# Patient Record
Sex: FEMALE | Race: WHITE | URBAN - METROPOLITAN AREA
[De-identification: names, ages, dates, MRNs, and addresses within clinical notes are randomized per-mention and may not be internally consistent; named-entity substitution may affect disease eponyms.]

---

## 2018-01-10 ENCOUNTER — APPOINTMENT (RX ONLY)
Dept: URBAN - METROPOLITAN AREA CLINIC 5 | Facility: CLINIC | Age: 68
Setting detail: DERMATOLOGY
End: 2018-01-10

## 2018-01-10 DIAGNOSIS — L85.3 XEROSIS CUTIS: ICD-10-CM

## 2018-01-10 DIAGNOSIS — B35.3 TINEA PEDIS: ICD-10-CM

## 2018-01-10 DIAGNOSIS — L20.89 OTHER ATOPIC DERMATITIS: ICD-10-CM

## 2018-01-10 PROBLEM — L20.84 INTRINSIC (ALLERGIC) ECZEMA: Status: ACTIVE | Noted: 2018-01-10

## 2018-01-10 PROBLEM — J44.9 CHRONIC OBSTRUCTIVE PULMONARY DISEASE, UNSPECIFIED: Status: ACTIVE | Noted: 2018-01-10

## 2018-01-10 PROBLEM — E05.90 THYROTOXICOSIS, UNSPECIFIED WITHOUT THYROTOXIC CRISIS OR STORM: Status: ACTIVE | Noted: 2018-01-10

## 2018-01-10 PROBLEM — F32.9 MAJOR DEPRESSIVE DISORDER, SINGLE EPISODE, UNSPECIFIED: Status: ACTIVE | Noted: 2018-01-10

## 2018-01-10 PROBLEM — F41.9 ANXIETY DISORDER, UNSPECIFIED: Status: ACTIVE | Noted: 2018-01-10

## 2018-01-10 PROBLEM — E78.5 HYPERLIPIDEMIA, UNSPECIFIED: Status: ACTIVE | Noted: 2018-01-10

## 2018-01-10 PROBLEM — K21.9 GASTRO-ESOPHAGEAL REFLUX DISEASE WITHOUT ESOPHAGITIS: Status: ACTIVE | Noted: 2018-01-10

## 2018-01-10 PROBLEM — I10 ESSENTIAL (PRIMARY) HYPERTENSION: Status: ACTIVE | Noted: 2018-01-10

## 2018-01-10 PROCEDURE — ? PRESCRIPTION

## 2018-01-10 PROCEDURE — 99203 OFFICE O/P NEW LOW 30 MIN: CPT

## 2018-01-10 PROCEDURE — ? COUNSELING

## 2018-01-10 RX ORDER — FLUTICASONE PROPIONATE 0.5 MG/G
CREAM TOPICAL BID
Qty: 1 | Refills: 1 | Status: ERX | COMMUNITY
Start: 2018-01-10

## 2018-01-10 RX ADMIN — FLUTICASONE PROPIONATE: 0.5 CREAM TOPICAL at 00:00

## 2018-01-10 ASSESSMENT — LOCATION SIMPLE DESCRIPTION DERM
LOCATION SIMPLE: RIGHT EYEBROW
LOCATION SIMPLE: RIGHT INDEX FINGER
LOCATION SIMPLE: RIGHT FOREARM
LOCATION SIMPLE: LEFT FOREARM
LOCATION SIMPLE: LEFT MIDDLE FINGER
LOCATION SIMPLE: GLABELLA
LOCATION SIMPLE: LEFT FOOT
LOCATION SIMPLE: LEFT EYELID

## 2018-01-10 ASSESSMENT — LOCATION ZONE DERM
LOCATION ZONE: EYELID
LOCATION ZONE: FINGER
LOCATION ZONE: ARM
LOCATION ZONE: FEET
LOCATION ZONE: FACE

## 2018-01-10 ASSESSMENT — LOCATION DETAILED DESCRIPTION DERM
LOCATION DETAILED: GLABELLA
LOCATION DETAILED: LEFT DISTAL PALMAR MIDDLE FINGER
LOCATION DETAILED: RIGHT MEDIAL EYEBROW
LOCATION DETAILED: LEFT DISTAL DORSAL FOREARM
LOCATION DETAILED: RIGHT INDEX FINGERTIP
LOCATION DETAILED: LEFT LATERAL PLANTAR FOOT
LOCATION DETAILED: LEFT LATERAL CANTHUS
LOCATION DETAILED: RIGHT DISTAL DORSAL FOREARM

## 2018-01-10 NOTE — HPI: OTHER
Condition:: Rash on left foot.
Please Describe Your Condition:: Two year history of scaling on left foot.  Is mildly pruritic and has formed vesicels.  Treated with Tinactin with slight benefit in the pruritus.  Moderate in severity.

## 2018-02-07 ENCOUNTER — APPOINTMENT (RX ONLY)
Dept: URBAN - METROPOLITAN AREA CLINIC 5 | Facility: CLINIC | Age: 68
Setting detail: DERMATOLOGY
End: 2018-02-07

## 2018-02-07 DIAGNOSIS — B35.3 TINEA PEDIS: ICD-10-CM

## 2018-02-07 DIAGNOSIS — L20.89 OTHER ATOPIC DERMATITIS: ICD-10-CM

## 2018-02-07 PROBLEM — L20.84 INTRINSIC (ALLERGIC) ECZEMA: Status: ACTIVE | Noted: 2018-02-07

## 2018-02-07 PROCEDURE — ? COUNSELING

## 2018-02-07 PROCEDURE — ? TREATMENT REGIMEN

## 2018-02-07 PROCEDURE — 99213 OFFICE O/P EST LOW 20 MIN: CPT

## 2018-02-07 ASSESSMENT — LOCATION ZONE DERM
LOCATION ZONE: FEET
LOCATION ZONE: FACE

## 2018-02-07 ASSESSMENT — LOCATION SIMPLE DESCRIPTION DERM
LOCATION SIMPLE: GLABELLA
LOCATION SIMPLE: RIGHT PLANTAR SURFACE
LOCATION SIMPLE: LEFT PLANTAR SURFACE

## 2018-02-07 ASSESSMENT — LOCATION DETAILED DESCRIPTION DERM
LOCATION DETAILED: RIGHT MEDIAL PLANTAR MIDFOOT
LOCATION DETAILED: LEFT MEDIAL PLANTAR MIDFOOT
LOCATION DETAILED: GLABELLA

## 2018-02-07 NOTE — PROCEDURE: TREATMENT REGIMEN
Detail Level: Zone
Continue Regimen: Lamisil prn\\nAmLactin cream bid prn dryness
Continue Regimen: fluticasone cream bid prn

## 2018-02-07 NOTE — HPI: OTHER
Condition:: tinea pedis
Please Describe Your Condition:: is following up for tinea pedis which has improved with Lamisil cream bid.  No pruritus now.

## 2018-05-15 ENCOUNTER — HOSPITAL ENCOUNTER (OUTPATIENT)
Dept: HOSPITAL 61 - KCIC MRI | Age: 68
Discharge: HOME | End: 2018-05-15
Payer: MEDICARE

## 2018-05-15 DIAGNOSIS — M16.11: Primary | ICD-10-CM

## 2018-05-15 PROCEDURE — 73718 MRI LOWER EXTREMITY W/O DYE: CPT

## 2018-11-14 ENCOUNTER — APPOINTMENT (RX ONLY)
Dept: URBAN - METROPOLITAN AREA CLINIC 5 | Facility: CLINIC | Age: 68
Setting detail: DERMATOLOGY
End: 2018-11-14

## 2018-11-14 DIAGNOSIS — L30.8 OTHER SPECIFIED DERMATITIS: ICD-10-CM

## 2018-11-14 PROCEDURE — 99212 OFFICE O/P EST SF 10 MIN: CPT

## 2018-11-14 PROCEDURE — ? COUNSELING

## 2018-11-14 PROCEDURE — ? TREATMENT REGIMEN

## 2018-11-14 PROCEDURE — ? PRESCRIPTION

## 2018-11-14 RX ORDER — CLOBETASOL PROPIONATE 0.5 MG/G
OINTMENT TOPICAL
Qty: 1 | Refills: 2 | Status: ERX | COMMUNITY
Start: 2018-11-14

## 2018-11-14 RX ADMIN — CLOBETASOL PROPIONATE: 0.5 OINTMENT TOPICAL at 17:29

## 2018-11-14 ASSESSMENT — LOCATION DETAILED DESCRIPTION DERM
LOCATION DETAILED: LEFT RING FINGERTIP
LOCATION DETAILED: RIGHT INDEX FINGERTIP

## 2018-11-14 ASSESSMENT — LOCATION ZONE DERM: LOCATION ZONE: FINGER

## 2018-11-14 ASSESSMENT — LOCATION SIMPLE DESCRIPTION DERM
LOCATION SIMPLE: LEFT RING FINGER
LOCATION SIMPLE: RIGHT INDEX FINGER

## 2018-11-14 ASSESSMENT — PAIN INTENSITY VAS: HOW INTENSE IS YOUR PAIN 0 BEING NO PAIN, 10 BEING THE MOST SEVERE PAIN POSSIBLE?: NO PAIN

## 2018-12-13 ENCOUNTER — APPOINTMENT (RX ONLY)
Dept: URBAN - METROPOLITAN AREA CLINIC 5 | Facility: CLINIC | Age: 68
Setting detail: DERMATOLOGY
End: 2018-12-13

## 2018-12-13 DIAGNOSIS — L30.8 OTHER SPECIFIED DERMATITIS: ICD-10-CM | Status: RESOLVING

## 2018-12-13 PROCEDURE — ? TREATMENT REGIMEN

## 2018-12-13 PROCEDURE — 99212 OFFICE O/P EST SF 10 MIN: CPT

## 2018-12-13 PROCEDURE — ? COUNSELING

## 2018-12-13 ASSESSMENT — LOCATION ZONE DERM
LOCATION ZONE: HAND
LOCATION ZONE: FINGER

## 2018-12-13 ASSESSMENT — LOCATION SIMPLE DESCRIPTION DERM
LOCATION SIMPLE: RIGHT HAND
LOCATION SIMPLE: LEFT MIDDLE FINGER

## 2018-12-13 ASSESSMENT — LOCATION DETAILED DESCRIPTION DERM
LOCATION DETAILED: RIGHT ULNAR DORSAL HAND
LOCATION DETAILED: LEFT DISTAL PALMAR MIDDLE FINGER

## 2018-12-13 ASSESSMENT — PAIN INTENSITY VAS: HOW INTENSE IS YOUR PAIN 0 BEING NO PAIN, 10 BEING THE MOST SEVERE PAIN POSSIBLE?: NO PAIN

## 2018-12-13 NOTE — PROCEDURE: TREATMENT REGIMEN
Detail Level: Detailed
Plan: If the clobetasol quits helping then we will send in protopic or eucrisa depending on her insurance.
Continue Regimen: clobetasol 0.05% ointment x 2 weeks then switch to vaseline

## 2019-06-26 ENCOUNTER — HOSPITAL ENCOUNTER (OUTPATIENT)
Dept: HOSPITAL 61 - KCIC MRI | Age: 69
Discharge: HOME | End: 2019-06-26
Payer: MEDICARE

## 2019-06-26 DIAGNOSIS — M25.48: ICD-10-CM

## 2019-06-26 DIAGNOSIS — M48.07: ICD-10-CM

## 2019-06-26 DIAGNOSIS — M51.36: Primary | ICD-10-CM

## 2019-06-26 DIAGNOSIS — M51.27: ICD-10-CM

## 2019-06-26 PROCEDURE — 72148 MRI LUMBAR SPINE W/O DYE: CPT

## 2019-06-26 NOTE — [facility]
MRI of the lumbar spine without contrast 6/26/2019

 

CLINICAL HISTORY: Low back pain which radiates down both legs for 6 

months.

 

TECHNIQUE: Unenhanced T1-weighted and T2-weighted sagittal and axial and 

inversion recovery sagittal images of the lumbar spine were obtained.

 

FINDINGS: Mild S-shaped curvature of the thoracolumbar spine is seen. 

Degenerative signal changes are seen involving all of the disks of the 

lumbar spine. Degenerative signal changes are seen within the marrow 

surrounding these discs. The conus medullaris is normal morphology, 

position, and signal characteristics. Rounded high signal intensity 

lesions are seen on the T2-weighted images which measure 8 mm to 3 cm in 

size. They likely represent cysts.

 

At the L1-2, L2-3 and L3-4 disc spaces there are minimal to mild 

generalized disc bulges. Degenerative changes are seen involving the facet

joints bilaterally. There is mild ligamentum flavum hypertrophy 

bilaterally. These findings when combined do not result in significant 

central spinal canal or neural foraminal stenosis.

 

At the L4-5 disc space there is a mild to moderate generalized disc bulge.

Degenerative changes are seen involving the facet joints bilaterally. 

There is mild ligamentum flavum hypertrophy bilaterally. There are small 

facet joint effusions. These findings when combined result in mild to 

moderate central spinal canal stenosis. No neural foraminal stenosis is 

seen.

 

At the L5-S1 disc space there is a mild generalized disc bulge. 

Superimposed this disc bulge is a focal central disc protrusion. This 

measures 3 mm in AP diameter. Degenerative changes are seen involving the 

facet joints bilaterally. There is mild to moderate ligamentum flavum 

hypertrophy bilaterally. These findings when combined result in mild 

central spinal canal stenosis. No neural foraminal stenosis is seen.

 

IMPRESSION: The changes of degenerative disc disease are seen throughout 

the lumbar spine. These findings result in mild to moderate central spinal

canal stenosis at L4-5 and mild central spinal canal stenosis at L5-S1. No

neural foraminal stenosis is seen.

 

Electronically signed by: Roc Moreland MD (6/26/2019 4:42 PM) [facility] [place]

## 2019-12-30 ENCOUNTER — HOSPITAL ENCOUNTER (OUTPATIENT)
Dept: HOSPITAL 61 - PNCL | Age: 69
Discharge: HOME | End: 2019-12-30
Attending: ANESTHESIOLOGY
Payer: MEDICARE

## 2019-12-30 DIAGNOSIS — J44.9: ICD-10-CM

## 2019-12-30 DIAGNOSIS — I10: ICD-10-CM

## 2019-12-30 DIAGNOSIS — M54.5: Primary | ICD-10-CM

## 2019-12-30 DIAGNOSIS — M19.90: ICD-10-CM

## 2019-12-30 PROCEDURE — G0463 HOSPITAL OUTPT CLINIC VISIT: HCPCS

## 2019-12-30 NOTE — PAIN
DATE OF SERVICE:  12/30/2019



INITIAL CONSULTATION FOR PAIN CLINIC



CHIEF COMPLAINT:  Low back and left lower extremity pain.



HISTORY OF PRESENT ILLNESS:  This is a 69-year-old female who presents with

history of pain in the low back and left lower extremity, posterior gluteus,

posterior thigh, lateral thigh and into the posterior knee on the left side. 

The patient reports that it has been on and off for about 2-3 years, not as a

result of any specific injury or action that she is aware of, but it is

gradually increasing with time and walking, worse with activity, standing and

changing positions, walking.  The patient reports it awakens her from sleep

about once a night, does not affect her bowel or bladder control, but does

affect her ability to walk.  She is not using any assistive devices, however,

such as canes or walkers.  The patient has not had any formal therapies

currently.  She has been doing some stretching on her own at home, but no formal

physical therapy, no counseling, chiropractic treatment, exercise or other

treatments.  The patient did see a neurosurgeon who is recommending conservative

treatment at this time.  The patient describes the pain as throbbing, tingling,

radiating, intermittent in intensity, but always present in low back, left

posterior gluteus, posterior thigh.  The patient has taken Neurontin, which

reported did decrease the pain mildly, but causes her to be very drowsy and

groggy.  The patient rates her disability rating from 0-10, 10 being the worst,

is an 8 with family home responsibilities and occupation, 6 with recreation and

social activity, 3 with self-care and 7 with life support activities, especially

sleeping.  The patient did have MRI scan of the lumbar spine showing

degenerative disk changes at L4-L5 and L5-S1 with a focal central disk

protrusion, superimposed on disk bulge at L5-S1 with moderate ligamentum flavum

hypertrophy bilaterally resulting in mild central spinal canal stenosis.  L4-L5

shows mild-to-moderate central spinal canal stenosis with moderate degenerative

disk bulge.



PAST MEDICAL HISTORY:  Significant for hearing loss, shortness of breath, COPD,

quit smoking 10 years ago, hypertension, arthritis.



PREVIOUS SURGERIES:  Include cataract extractions, cholecystectomy, breast

biopsy and tonsillectomy.



CURRENT MEDICATIONS:  Include simvastatin, Linzess, gabapentin, Celebrex,

Mysoline, tramadol, Advair inhaler, Wellbutrin, and levothyroxine.



ALLERGIES:  THE PATIENT IS ALLERGIC TO CODEINE.



FAMILY HISTORY:  Significant for cancers.



SOCIAL HISTORY:  The patient drinks alcohol about 4 cans a month on average. 

Quit smoking 10 years ago.  Does not use any illegal, illicit or recreational

drugs.  She is , lives with her spouse locally in [place], [place]. 

Reports she is currently retired.



REVIEW OF SYSTEMS:  The patient's review of systems is positive for those items

mentioned in history of present illness.  All systems reviewed and otherwise

negative.  It is complete, full and well documented on the patient's chart.



PHYSICAL EXAMINATION:

VITAL SIGNS:  The patient's blood pressure is 140/81, pulse is 80, respirations

18, temperature 97.2 degrees Fahrenheit, height is 5 feet 2 inches, weight 175

pounds.

GENERAL:  The patient is awake, alert, oriented, appropriate, very pleasant

demeanor.

HEENT:  Exam shows normocephalic, atraumatic.  Extraocular movements are intact

and symmetrical.  Oral cavity:  Mucous membranes moist and pink.  Dentition is

intact.

NECK:  Shows anterior throat supple without palpable lymphadenopathy noted. 

Swallow reflex symmetrical.

CHEST:  Shows normal on inspection.  Breath sounds are clear bilaterally.

HEART:  Shows S1, S2 clear.  No murmurs auscultated.

ABDOMEN:  Soft, nontender, nondistended.  No palpable organomegaly is noted.  No

rebound or guarding demonstrated.

BACK:  Shows spine grossly in the midline.  Normal-appearing thoracic kyphosis

and lumbar lordotic curvature slightly flattened.  Lumbar paraspinous muscle

shows symmetrical on inspection, with palpation shows some moderate tenderness

diffusely in the middle and lower distribution of paraspinous muscles only, but

without radiation.  No tenderness over the spinous processes, sacrum or

sacroiliac regions.  The patient has good rotational motion of lumbar spine,

both laterally greater than 10 degrees right and left as well as extension

greater than 10 degrees, forward flexion 45 degrees without significant pain

reported.

EXTREMITIES:  The patient's lower extremities show deep tendon reflexes 1+ in

the patellar and tendo-calcaneus tendons.  Motor exam is approximately 4 on a

scale of 5 with left dorsiflexion, extension, 5/5 on the right, quadriceps and

hamstring flexion likewise 4/5 left, 5/5 on the right.  Peripheral pulses are 1+

in posterior tibia.  No peripheral edema is noted.  Lower extremities are warm

and dry to touch, equal in color and appearance.  Straight leg raise noted to be

negative for reproduction of radicular symptoms bilaterally.  Sebastian's maneuver

is positive on the right, but negative on the left.  Gaenslen's maneuvers are

negative bilaterally.  The patient is able to stand.  It is difficult to turn

and stand on her toes, loses balance easily, but is walking with a slight

favoring gait favoring the left lower extremity, but only mildly, not using any

assistive devices to ambulate.



IMPRESSION:

1.  This is a 69-year-old female with a 2-3 year history of increasing pain in

the low back, left lower extremity in a radicular fashion.

2.  MRI scan of lumbar spine as noted.

3.  Chronic obstructive pulmonary disease.

4.  Hypertension.

5.  Arthritis.



PLAN:  Options were discussed with the patient including conservative medical

managements, physical therapies and interventional techniques.  She would like

to pursue interventional techniques.  We discussed a lumbar epidural steroid

injection using description as well as anatomical models to describe the

procedure.  The patient has family in town; however, they are leaving later

today.  She would like to get home and see them before they go without having a

procedure today.  We will reschedule this for approximately 1 week from now and

discuss the procedure more on her return.  The patient understands and we will

reschedule with plan on lumbar epidural steroid injection in approximately 1

week.

 



______________________________

DAISY ALMANZA MD



DR:  LAURA/douglas  JOB#:  [id number] / [id number]

DD:  12/30/2019 12:28  DT:  12/30/2019 13:13



LILI Hui MD

## 2020-01-13 ENCOUNTER — HOSPITAL ENCOUNTER (OUTPATIENT)
Dept: HOSPITAL 61 - PNCL | Age: 70
End: 2020-01-13
Attending: ANESTHESIOLOGY
Payer: MEDICARE

## 2020-01-13 DIAGNOSIS — M51.16: Primary | ICD-10-CM

## 2020-01-13 DIAGNOSIS — M48.061: ICD-10-CM

## 2020-01-13 PROCEDURE — 62323 NJX INTERLAMINAR LMBR/SAC: CPT

## 2020-01-13 NOTE — PAIN
DATE OF SERVICE:  01/13/2020



PROGRESS NOTE FOR PAIN CLINIC



DIAGNOSES:  Lumbar radiculopathy with lumbar degenerative disk disease and

lumbar spinal stenosis.



HISTORY OF PRESENT ILLNESS:  The patient is a 69-year-old female who returns for

followup status post initial evaluation and rescheduled for a lumbar epidural

steroid injection today.  The patient reports still significant pain in low

back, left lower extremity, posterior gluteus, posterior thigh, posterior calf,

as it was worse with walking, standing, changing positions, better with sitting

or resting.  Does not awaken her from sleep at night.  The patient reports no

new motor or sensory deficits, no new changes.  Rates her pain as an 8 on a

scale of 10 at its worst over the past week, 7 on average, 4 at its least and is

a 7 today.  The patient reports it is tight, shooting, burning, on and off in

intensity, worse with activity.  The patient reports no new motor or sensory

deficits, no new bowel or bladder incontinence or other complaints.



PHYSICAL EXAMINATION:

VITAL SIGNS:  The patient's blood pressure is 181/111, pulse 85, respirations

18, temperature 97.5 degrees Fahrenheit, height 5 feet 2 inches, weight is 176

pounds.

GENERAL:  The patient is awake, alert, oriented, appropriate, very pleasant

demeanor.

HEENT:  Shows normocephalic, atraumatic.  Extraocular movements are intact and

symmetrical.  Oral cavity shows mucous membranes moist and pink.  Dentition is

intact.

NECK:  Shows anterior throat supple without palpable lymphadenopathy noted. 

Swallow reflex symmetrical.

CHEST:  Shows normal on inspection.  Breath sounds clear to auscultation

bilaterally.

HEART:  Shows S1, S2 clear.  No murmurs auscultated.

ABDOMEN:  Soft, nontender, nondistended.  No palpable organomegaly is noted.  No

rebound or guarding demonstrated.

BACK:  Shows spine grossly in the midline.  Normal thoracic kyphosis, some minor

flattening of lumbar lordotic curvature.  Lumbar paraspinous muscle shows

symmetrical on inspection, on palpation shows some moderate tenderness diffusely

bilaterally going diffusely without significant radiation.

EXTREMITIES:  The patient's lower extremities show deep tendon reflexes at 1+ in

the patellar and tendo calcaneus tendons are equal.  Motor exam is approximately

4 on a scale 5 on the left and 5/5 on the right with dorsiflexion and extension.

 The patient's peripheral pulses are 1+ posterior tibia.  No peripheral edema is

noted bilaterally.



Options were discussed with the patient.  The patient's old chart was reviewed

as her current medication regimen updated.  Current review of systems updated

today as well.  We will proceed with a first in the series of lumbar epidural

steroid injection today with fluoroscopic guidance.  Risks were again discussed

including, but not limited to bleeding, infection, possibility of epidural

hematoma, subsequent neurological compromise, dural puncture, headaches, spinal

cord and/or nerve damage, side effects of steroid medication and poor results

regarding pain control.  The patient understands and wished to proceed.  The

patient will return to clinic in approximately 2 weeks for followup, was

counseled on return appointment, activity level and side effects to be aware of.



DIAGNOSES:  Lumbar radiculopathy with lumbar degenerative disk disease and

lumbar spinal stenosis.



PROCEDURE:  Lumbar epidural steroid injection, translaminar approach L5-S1 level

using C-arm fluoroscopic guidance under sterile prep and drape using local

anesthetic.



MEDICATION INJECTED:  A total of 120 mg Depo-Medrol plus 10 mL of

preservative-free normal saline and 2 mL of contrast.



CONDITION AT DISCHARGE:  Stable.  The patient tolerated the procedure well, had

no complications.

 



______________________________

DAISY ALMANZA MD



DR:  LAURA/douglas  JOB#:  [id number] / [id number]

DD:  01/13/2020 10:29  DT:  01/13/2020 22:13

## 2020-11-17 ENCOUNTER — HOSPITAL ENCOUNTER (OUTPATIENT)
Dept: HOSPITAL 61 - KCIC US | Age: 70
End: 2020-11-17
Attending: INTERNAL MEDICINE
Payer: MEDICARE

## 2020-11-17 DIAGNOSIS — N28.89: ICD-10-CM

## 2020-11-17 DIAGNOSIS — N17.9: Primary | ICD-10-CM

## 2020-11-17 PROCEDURE — 76770 US EXAM ABDO BACK WALL COMP: CPT

## 2020-11-17 NOTE — [facility]
INDICATION: Reason: ACUTE KIDNEY FAILURE / Spl. Instructions:  / History: 

 

COMPARISON: None.

 

TECHNIQUE: Grayscale and color ultrasound images obtained of the bilateral

kidneys and bladder.

 

FINDINGS: 

 

Right Kidney: 85 mm. No hydronephrosis. Echogenic cortex. Septated cystic 

lesion 29 x 32 mm.

Left Kidney: 103 mm. No hydronephrosis. Echogenic cortex. There is a 

couple of hypoechoic lesions measuring 16 x 15 mm and 15 x 12 mm.

Bladder: 81 cc prevoid. Bilateral ureter jets seen.

 

IMPRESSION: 

 

*  Echogenic kidneys which can be seen with chronic medical renal disease.

 

*  No hydronephrosis.

 

*  Bilateral cystic lesions the kidneys with one of the right appearing 

septated. On the left there is some low-level internal echoes which could 

be artifactual or secondary to some internal complexity. Follow-up could 

be obtained in a few months to ensure no growth given the complexity.

 

Electronically signed by: Hussein Feliz MD (11/17/2020 3:52 PM) [id number]

## 2021-05-05 ENCOUNTER — APPOINTMENT (RX ONLY)
Dept: URBAN - METROPOLITAN AREA CLINIC 141 | Facility: CLINIC | Age: 71
Setting detail: DERMATOLOGY
End: 2021-05-05

## 2021-05-05 DIAGNOSIS — D69.2 OTHER NONTHROMBOCYTOPENIC PURPURA: ICD-10-CM

## 2021-05-05 PROCEDURE — ? COUNSELING

## 2021-05-05 PROCEDURE — ? TREATMENT REGIMEN

## 2021-05-05 PROCEDURE — 99212 OFFICE O/P EST SF 10 MIN: CPT

## 2021-05-05 ASSESSMENT — LOCATION SIMPLE DESCRIPTION DERM
LOCATION SIMPLE: LEFT FOREARM
LOCATION SIMPLE: RIGHT POSTERIOR UPPER ARM

## 2021-05-05 ASSESSMENT — LOCATION DETAILED DESCRIPTION DERM
LOCATION DETAILED: LEFT DISTAL DORSAL FOREARM
LOCATION DETAILED: RIGHT DISTAL POSTERIOR UPPER ARM
LOCATION DETAILED: LEFT PROXIMAL DORSAL FOREARM

## 2021-05-05 ASSESSMENT — LOCATION ZONE DERM: LOCATION ZONE: ARM

## 2021-05-05 NOTE — PROCEDURE: TREATMENT REGIMEN
Otc Regimen: Arnica\\nDermamend\\nCerave Anti Itch
Samples Given: Cerave anti itch
Plan: Patient will return as needed.
Detail Level: Simple

## 2021-05-13 ENCOUNTER — HOSPITAL ENCOUNTER (OUTPATIENT)
Dept: HOSPITAL 61 - KCIC US | Age: 71
End: 2021-05-13
Attending: INTERNAL MEDICINE
Payer: MEDICARE

## 2021-05-13 DIAGNOSIS — N28.1: Primary | ICD-10-CM

## 2021-05-13 PROCEDURE — 76770 US EXAM ABDO BACK WALL COMP: CPT

## 2021-05-13 NOTE — [facility]
Renal sonography



Clinical indications: Follow-up of complex renal cyst.



COMPARISON: November 17, 2020.



FINDINGS: The longitudinal and AP and transverse dimensions of the right kidney are 10.7 cm and 5.5 c
m and 4.1 cm respectively. There is a cyst of the upper pole of the right kidney measuring 37 mm in g
reatest dimension. A thin septation is seen anteriorly. No intramural nodularity seen. This cyst ashely
ured 32 mm previously. The longitudinal and AP and transverse dimensions of the left kidney are 10.5 
cm and 5.2 cm and 4.6 cm respectively. There is a small cyst seen medially measuring 18 mm. This cyst
 appears simple. There is a small cyst more laterally measuring 11 mm. Mild internal echoes are seen 
within this cyst. On the previous study, the cysts measured 15 mm and 16 mm respectively. No hydronep
hrosis is seen on either side. There is an increase in cortical echogenicity of both kidneys which ma
y be seen with medical renal disease. Urinary bladder is mildly distended.



IMPRESSION: No hydronephrosis.



Increase in cortical echogenicity of both kidneys which may be seen with medical renal disease.



Bilateral renal cysts. Bosniak classification 1 and 2. No further follow-up is needed.



Electronically signed by: Eyad Manzanares MD (5/13/2021 3:49 PM) [id number]